# Patient Record
Sex: MALE | Race: WHITE
[De-identification: names, ages, dates, MRNs, and addresses within clinical notes are randomized per-mention and may not be internally consistent; named-entity substitution may affect disease eponyms.]

---

## 2022-01-22 ENCOUNTER — HOSPITAL ENCOUNTER (EMERGENCY)
Dept: HOSPITAL 93 - ER | Age: 77
LOS: 1 days | Discharge: HOME | End: 2022-01-23
Payer: COMMERCIAL

## 2022-01-22 VITALS — HEIGHT: 63 IN | BODY MASS INDEX: 27.29 KG/M2 | WEIGHT: 154 LBS

## 2022-01-22 DIAGNOSIS — I50.9: Primary | ICD-10-CM

## 2022-01-22 DIAGNOSIS — J84.10: ICD-10-CM

## 2022-01-22 DIAGNOSIS — Z11.52: ICD-10-CM

## 2022-02-12 ENCOUNTER — HOSPITAL ENCOUNTER (EMERGENCY)
Dept: HOSPITAL 93 - ER | Age: 77
Discharge: HOME | End: 2022-02-12
Payer: COMMERCIAL

## 2022-02-12 VITALS — BODY MASS INDEX: 27.29 KG/M2 | WEIGHT: 154 LBS | HEIGHT: 63 IN

## 2022-02-12 DIAGNOSIS — S22.089A: ICD-10-CM
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
## 2022-02-12 DIAGNOSIS — S32.059A: Primary | ICD-10-CM

## 2022-02-12 DIAGNOSIS — S00.91XA: ICD-10-CM

## 2022-02-12 DIAGNOSIS — M54.50: ICD-10-CM

## 2022-05-31 ENCOUNTER — HOSPITAL ENCOUNTER (OUTPATIENT)
Dept: HOSPITAL 93 - RX STUDY | Age: 77
Discharge: HOME | End: 2022-05-31
Attending: INTERNAL MEDICINE
Payer: COMMERCIAL

## 2022-05-31 DIAGNOSIS — R13.10: Primary | ICD-10-CM

## 2024-02-12 ENCOUNTER — HOSPITAL ENCOUNTER (EMERGENCY)
Dept: HOSPITAL 93 - ER | Age: 79
Discharge: HOME | End: 2024-02-12
Payer: COMMERCIAL

## 2024-02-12 VITALS — BODY MASS INDEX: 25.69 KG/M2 | WEIGHT: 145 LBS | HEIGHT: 63 IN

## 2024-02-12 DIAGNOSIS — J06.9: Primary | ICD-10-CM

## 2024-02-12 DIAGNOSIS — Z95.1: ICD-10-CM

## 2024-02-12 DIAGNOSIS — Z20.822: ICD-10-CM

## 2024-02-12 LAB
ANION GAP SERPL CALC-SCNC: 8 MMOL/L (ref 10–20)
BACTERIA UR QL AUTO: 12.5 UL (ref 0–1933)
BASE EXCESS BLD CALC-SCNC: -1.6 MMOL/L
BASOPHILS NFR BLD AUTO: 0.4 % (ref 0–1.5)
BUN SERPL-MCNC: 24 MG/DL (ref 7–18)
BUN/CREAT SERPL: 21 (ref 7–25)
CALCIUM SERPL-MCNC: 8.8 MG/DL (ref 8.5–10.1)
CHLORIDE SERPL-SCNC: 102 MMOL/L (ref 98–107)
CO2 SERPL-SCNC: 29 MEQ/L (ref 21–32)
CREAT SERPL-MCNC: 1.17 MG/DL (ref 0.7–1.3)
EGFRCR SERPLBLD CKD-EPI 2021: 59.98 ML/MIN/{1.73_M2}
EOSINOPHIL NFR BLD AUTO: 1.2 % (ref 0–7)
EPI CELLS URNS QL MICRO: 6 UL (ref 0–38.8)
ERYTHROCYTE [DISTWIDTH] IN BLOOD BY AUTOMATED COUNT: 15.5 % (ref 11.5–14.5)
GLUCOSE SERPL-MCNC: 124 MG/DL (ref 65–100)
HCO3 BLDA-SCNC: 21.8 MMOL/L (ref 23–25)
HCT VFR BLD AUTO: 32.7 % (ref 39–48)
HGB BLD-MCNC: 10.7 G/DL (ref 13–16)
LYMPHOCYTES NFR BLD AUTO: 8.2 % (ref 12–44)
MCH RBC QN AUTO: 25.5 PG (ref 27–32)
MCHC RBC AUTO-ENTMCNC: 32.8 G/DL (ref 32–36)
MCV RBC AUTO: 77.7 FL (ref 80–100)
MONOCYTES NFR BLD AUTO: 14.9 % (ref 2–10)
NEUTROPHILS NFR BLD AUTO: 75.3 % (ref 47–76)
O2 CT BLDA-SCNC: 21 %
OSMOLALITY SERPL: 276 MOSM/KG (ref 275–295)
PH BLDA: 7.43 [PH] (ref 7.35–7.45)
PH UR: 6 [PH] (ref 5–8)
PLATELET # BLD AUTO: 194 K/UL (ref 150–450)
PMV BLD AUTO: 7.5 FL (ref 7.2–11.1)
PO2 BLDA: 70 MMHG (ref 80–100)
POTASSIUM SERPL-SCNC: 4.31 MEQ/L (ref 3.5–5.1)
RBC # BLD AUTO: 4.21 M/UL (ref 4–6)
RBC #/AREA URNS AUTO: 4 UL (ref 0–20.8)
SODIUM SERPL-SCNC: 135 MMOL/L (ref 136–145)
SP GR UR STRIP: 1.02 (ref 1–1.03)
UROBILINOGEN UR STRIP-MCNC: 1 E.U./DL
WBC # BLD AUTO: 6.8 K/UL (ref 4.5–11)
WBC URNS QL MICRO: 3.5 UL (ref 0–23.2)

## 2024-08-01 ENCOUNTER — HOSPITAL ENCOUNTER (INPATIENT)
Dept: HOSPITAL 93 - ER | Age: 79
LOS: 8 days | Discharge: HOME | DRG: 291 | End: 2024-08-09
Attending: GENERAL PRACTICE | Admitting: GENERAL PRACTICE
Payer: COMMERCIAL

## 2024-08-01 VITALS — HEIGHT: 63 IN | BODY MASS INDEX: 24.8 KG/M2 | WEIGHT: 140 LBS

## 2024-08-01 DIAGNOSIS — I11.0: Primary | ICD-10-CM

## 2024-08-01 DIAGNOSIS — E87.5: ICD-10-CM

## 2024-08-01 DIAGNOSIS — J84.10: ICD-10-CM

## 2024-08-01 DIAGNOSIS — I50.23: ICD-10-CM

## 2024-08-01 DIAGNOSIS — R09.02: ICD-10-CM

## 2024-08-01 DIAGNOSIS — J90: ICD-10-CM

## 2024-08-01 LAB
ALT SERPL-CCNC: 71 U/L (ref 12–78)
ANION GAP SERPL CALC-SCNC: 11 MMOL/L (ref 10–20)
BACTERIA UR QL AUTO: 18.8 UL (ref 0–1933)
BASE EXCESS BLD CALC-SCNC: -0.7 MMOL/L
BASOPHILS NFR BLD AUTO: 0.6 % (ref 0–1.5)
BUN SERPL-MCNC: 18 MG/DL (ref 7–18)
BUN/CREAT SERPL: 19 (ref 7–25)
CALCIUM SERPL-MCNC: 9.1 MG/DL (ref 8.5–10.1)
CASTS # UR AUTO: 0.3 UL (ref 0–1.4)
CHLORIDE SERPL-SCNC: 99 MMOL/L (ref 98–107)
CHOLEST SERPL-MCNC: 131 MG/DL (ref 0–200)
CHOLEST/HDLC SERPL: 2.3 {RATIO} (ref 0–5)
CK SERPL-CCNC: 86 U/L (ref 39–308)
CO2 SERPL-SCNC: 31 MEQ/L (ref 21–32)
CREAT SERPL-MCNC: 0.94 MG/DL (ref 0.7–1.3)
DIGOXIN SERPL-MCNC: 0.2 NG/ML (ref 0.8–2)
EGFRCR SERPLBLD CKD-EPI 2021: 77.02 ML/MIN/{1.73_M2}
EOSINOPHIL NFR BLD AUTO: 1.9 % (ref 0–7)
EPI CELLS URNS QL MICRO: 2.6 UL (ref 0–38.8)
ERYTHROCYTE [DISTWIDTH] IN BLOOD BY AUTOMATED COUNT: 17.1 % (ref 11.5–14.5)
GLUCOSE SERPL-MCNC: 106 MG/DL (ref 65–100)
HCO3 BLDA-SCNC: 23.3 MMOL/L (ref 23–25)
HCT VFR BLD AUTO: 40.6 % (ref 39–48)
HDLC SERPL-MCNC: 57 MG/DL (ref 40–60)
HGB BLD-MCNC: 13.5 G/DL (ref 13–16)
INR PPP: 1.12
LDH SERPL L TO P-CCNC: 337 U/L (ref 87–241)
LDLC SERPL CALC-MCNC: 58 MG/DL (ref 0–130)
LYMPHOCYTES NFR BLD AUTO: 16.1 % (ref 12–44)
MAGNESIUM SERPL-MCNC: 2.1 MG/DL (ref 1.8–2.4)
MCH RBC QN AUTO: 26.4 PG (ref 27–32)
MCHC RBC AUTO-ENTMCNC: 33.2 G/DL (ref 32–36)
MCV RBC AUTO: 79.5 FL (ref 80–100)
MONOCYTES NFR BLD AUTO: 12.4 % (ref 2–10)
NEUTROPHILS NFR BLD AUTO: 69 % (ref 47–76)
O2 CT BLDA-SCNC: 32 %
OSMOLALITY SERPL: 272 MOSM/KG (ref 275–295)
PH BLDA: 7.42 [PH] (ref 7.35–7.45)
PH UR: 6 [PH] (ref 5–8)
PLATELET # BLD AUTO: 200 K/UL (ref 150–450)
PMV BLD AUTO: 7.1 FL (ref 7.2–11.1)
PO2 BLDA: 92.9 MMHG (ref 80–100)
POTASSIUM SERPL-SCNC: 5.51 MEQ/L (ref 3.5–5.1)
PROTHROMBIN TIME: 11.7 SECONDS (ref 9–11.5)
RBC # BLD AUTO: 5.11 M/UL (ref 4–6)
RBC #/AREA URNS AUTO: 26.8 UL (ref 0–20.8)
SCC AG SERPL-SCNC: 60 U/L (ref 15–37)
SODIUM SERPL-SCNC: 135 MMOL/L (ref 136–145)
SP GR UR STRIP: 1.02 (ref 1–1.03)
TRIGL SERPL-MCNC: 79 MG/DL (ref 0–150)
URATE SERPL-MCNC: 2.7 MG/DL (ref 3.5–8.5)
UROBILINOGEN UR STRIP-MCNC: 1 E.U./DL
VLDLC SERPL CALC-MCNC: 15 MG/DL (ref 0–39)
WBC # BLD AUTO: 8.9 K/UL (ref 4.5–11)
WBC URNS QL MICRO: 15.4 UL (ref 0–23.2)

## 2024-08-01 PROCEDURE — BB24ZZZ COMPUTERIZED TOMOGRAPHY (CT SCAN) OF BILATERAL LUNGS: ICD-10-PCS | Performed by: INTERNAL MEDICINE

## 2024-08-01 PROCEDURE — B246ZZZ ULTRASONOGRAPHY OF RIGHT AND LEFT HEART: ICD-10-PCS | Performed by: INTERNAL MEDICINE

## 2024-08-03 LAB
ALBUMIN SERPL-MCNC: 3.2 GM/DL (ref 3.4–5)
ALP SERPL-CCNC: 94 U/L (ref 50–136)
ALT SERPL-CCNC: 39 U/L (ref 12–78)
ANION GAP SERPL CALC-SCNC: 22 MMOL/L (ref 10–20)
BASOPHILS NFR BLD AUTO: 0.2 % (ref 0–1.5)
BILIRUB SERPL-MCNC: 1.61 MG/DL (ref 0.3–1.2)
BUN SERPL-MCNC: 22 MG/DL (ref 7–18)
BUN/CREAT SERPL: 26 (ref 7–25)
CALCIUM SERPL-MCNC: 8.8 MG/DL (ref 8.5–10.1)
CHLORIDE SERPL-SCNC: 90 MMOL/L (ref 98–107)
CO2 SERPL-SCNC: 31 MEQ/L (ref 21–32)
CREAT SERPL-MCNC: 0.84 MG/DL (ref 0.7–1.3)
EGFRCR SERPLBLD CKD-EPI 2021: 87.7 ML/MIN/{1.73_M2}
EOSINOPHIL NFR BLD AUTO: 0.5 % (ref 0–7)
ERYTHROCYTE [DISTWIDTH] IN BLOOD BY AUTOMATED COUNT: 17 % (ref 11.5–14.5)
GLOBULIN SER CALC-MCNC: 3.7 G/DL (ref 2.4–3.5)
GLUCOSE SERPL-MCNC: 108 MG/DL (ref 65–100)
HCT VFR BLD AUTO: 39.9 % (ref 39–48)
HGB BLD-MCNC: 13.6 G/DL (ref 13–16)
LYMPHOCYTES NFR BLD AUTO: 12.7 % (ref 12–44)
MAGNESIUM SERPL-MCNC: 1.9 MG/DL (ref 1.8–2.4)
MCH RBC QN AUTO: 26.5 PG (ref 27–32)
MCHC RBC AUTO-ENTMCNC: 34.1 G/DL (ref 32–36)
MCV RBC AUTO: 77.8 FL (ref 80–100)
MONOCYTES NFR BLD AUTO: 13.7 % (ref 2–10)
NEUTROPHILS NFR BLD AUTO: 72.9 % (ref 47–76)
OSMOLALITY SERPL: 281 MOSM/KG (ref 275–295)
PHOSPHATE SERPL-MCNC: 3.7 MG/DL (ref 2.5–4.9)
PLATELET # BLD AUTO: 192 K/UL (ref 150–450)
PMV BLD AUTO: 7.5 FL (ref 7.2–11.1)
POTASSIUM SERPL-SCNC: 3.72 MEQ/L (ref 3.5–5.1)
PROT SERPL-MCNC: 6.9 GM/DL (ref 6.4–8.2)
RBC # BLD AUTO: 5.13 M/UL (ref 4–6)
SCC AG SERPL-SCNC: 35 U/L (ref 15–37)
SODIUM SERPL-SCNC: 139 MMOL/L (ref 136–145)
WBC # BLD AUTO: 10 K/UL (ref 4.5–11)

## 2024-08-03 PROCEDURE — 4A12X4Z MONITORING OF CARDIAC ELECTRICAL ACTIVITY, EXTERNAL APPROACH: ICD-10-PCS | Performed by: GENERAL PRACTICE

## 2024-08-04 LAB
ALBUMIN SERPL-MCNC: 3.1 GM/DL (ref 3.4–5)
ALP SERPL-CCNC: 92 U/L (ref 50–136)
ALT SERPL-CCNC: 36 U/L (ref 12–78)
ANION GAP SERPL CALC-SCNC: 12 MMOL/L (ref 10–20)
BASOPHILS NFR BLD AUTO: 0.1 % (ref 0–1.5)
BILIRUB SERPL-MCNC: 1.67 MG/DL (ref 0.3–1.2)
BUN SERPL-MCNC: 21 MG/DL (ref 7–18)
BUN/CREAT SERPL: 23 (ref 7–25)
CALCIUM SERPL-MCNC: 8.7 MG/DL (ref 8.5–10.1)
CHLORIDE SERPL-SCNC: 88 MMOL/L (ref 98–107)
CO2 SERPL-SCNC: 35 MEQ/L (ref 21–32)
CREAT SERPL-MCNC: 0.93 MG/DL (ref 0.7–1.3)
EGFRCR SERPLBLD CKD-EPI 2021: 77.98 ML/MIN/{1.73_M2}
EOSINOPHIL NFR BLD AUTO: 0.1 % (ref 0–7)
ERYTHROCYTE [DISTWIDTH] IN BLOOD BY AUTOMATED COUNT: 17.1 % (ref 11.5–14.5)
GLOBULIN SER CALC-MCNC: 3.7 G/DL (ref 2.4–3.5)
GLUCOSE SERPL-MCNC: 100 MG/DL (ref 65–100)
HCT VFR BLD AUTO: 40.6 % (ref 39–48)
HGB BLD-MCNC: 13.7 G/DL (ref 13–16)
LYMPHOCYTES NFR BLD AUTO: 5.4 % (ref 12–44)
MAGNESIUM SERPL-MCNC: 1.9 MG/DL (ref 1.8–2.4)
MCH RBC QN AUTO: 26.1 PG (ref 27–32)
MCHC RBC AUTO-ENTMCNC: 33.7 G/DL (ref 32–36)
MCV RBC AUTO: 77.5 FL (ref 80–100)
MONOCYTES NFR BLD AUTO: 14.9 % (ref 2–10)
NEUTROPHILS NFR BLD AUTO: 79.5 % (ref 47–76)
OSMOLALITY SERPL: 268 MOSM/KG (ref 275–295)
PHOSPHATE SERPL-MCNC: 2.8 MG/DL (ref 2.5–4.9)
PLATELET # BLD AUTO: 201 K/UL (ref 150–450)
PMV BLD AUTO: 7.7 FL (ref 7.2–11.1)
POTASSIUM SERPL-SCNC: 3.14 MEQ/L (ref 3.5–5.1)
PROT SERPL-MCNC: 6.8 GM/DL (ref 6.4–8.2)
RBC # BLD AUTO: 5.23 M/UL (ref 4–6)
SCC AG SERPL-SCNC: 29 U/L (ref 15–37)
SODIUM SERPL-SCNC: 132 MMOL/L (ref 136–145)
WBC # BLD AUTO: 11.7 K/UL (ref 4.5–11)

## 2024-08-05 LAB
ALBUMIN SERPL-MCNC: 2.8 GM/DL (ref 3.4–5)
ALP SERPL-CCNC: 91 U/L (ref 50–136)
ALT SERPL-CCNC: 32 U/L (ref 12–78)
ANION GAP SERPL CALC-SCNC: 18 MMOL/L (ref 10–20)
BASOPHILS NFR BLD AUTO: 0.3 % (ref 0–1.5)
BILIRUB SERPL-MCNC: 1.39 MG/DL (ref 0.3–1.2)
BUN SERPL-MCNC: 38 MG/DL (ref 7–18)
BUN/CREAT SERPL: 29 (ref 7–25)
CALCIUM SERPL-MCNC: 8.6 MG/DL (ref 8.5–10.1)
CHLORIDE SERPL-SCNC: 87 MMOL/L (ref 98–107)
CO2 SERPL-SCNC: 29 MEQ/L (ref 21–32)
CREAT SERPL-MCNC: 1.29 MG/DL (ref 0.7–1.3)
EGFRCR SERPLBLD CKD-EPI 2021: 53.45 ML/MIN/{1.73_M2}
EOSINOPHIL NFR BLD AUTO: 1 % (ref 0–7)
ERYTHROCYTE [DISTWIDTH] IN BLOOD BY AUTOMATED COUNT: 17.1 % (ref 11.5–14.5)
GLOBULIN SER CALC-MCNC: 3.6 G/DL (ref 2.4–3.5)
GLUCOSE SERPL-MCNC: 85 MG/DL (ref 65–100)
HCT VFR BLD AUTO: 39.8 % (ref 39–48)
HGB BLD-MCNC: 13.4 G/DL (ref 13–16)
LYMPHOCYTES NFR BLD AUTO: 11.2 % (ref 12–44)
MCH RBC QN AUTO: 26.3 PG (ref 27–32)
MCHC RBC AUTO-ENTMCNC: 33.8 G/DL (ref 32–36)
MCV RBC AUTO: 78 FL (ref 80–100)
MONOCYTES NFR BLD AUTO: 15.4 % (ref 2–10)
NEUTROPHILS NFR BLD AUTO: 72.1 % (ref 47–76)
OSMOLALITY SERPL: 269 MOSM/KG (ref 275–295)
PLATELET # BLD AUTO: 200 K/UL (ref 150–450)
PMV BLD AUTO: 7.7 FL (ref 7.2–11.1)
POTASSIUM SERPL-SCNC: 4.25 MEQ/L (ref 3.5–5.1)
PROT SERPL-MCNC: 6.4 GM/DL (ref 6.4–8.2)
RBC # BLD AUTO: 5.1 M/UL (ref 4–6)
SCC AG SERPL-SCNC: 42 U/L (ref 15–37)
SODIUM SERPL-SCNC: 130 MMOL/L (ref 136–145)
WBC # BLD AUTO: 10.8 K/UL (ref 4.5–11)

## 2024-08-06 LAB
ANION GAP SERPL CALC-SCNC: 13 MMOL/L (ref 10–20)
BUN SERPL-MCNC: 40 MG/DL (ref 7–18)
BUN/CREAT SERPL: 34 (ref 7–25)
CALCIUM SERPL-MCNC: 8.7 MG/DL (ref 8.5–10.1)
CHLORIDE SERPL-SCNC: 88 MMOL/L (ref 98–107)
CO2 SERPL-SCNC: 30 MEQ/L (ref 21–32)
CREAT SERPL-MCNC: 1.17 MG/DL (ref 0.7–1.3)
EGFRCR SERPLBLD CKD-EPI 2021: 59.83 ML/MIN/{1.73_M2}
GLUCOSE SERPL-MCNC: 92 MG/DL (ref 65–100)
OSMOLALITY SERPL: 265 MOSM/KG (ref 275–295)
POTASSIUM SERPL-SCNC: 3.93 MEQ/L (ref 3.5–5.1)
SODIUM SERPL-SCNC: 127 MMOL/L (ref 136–145)

## 2024-08-07 LAB
ALBUMIN SERPL-MCNC: 2.5 GM/DL (ref 3.4–5)
ALP SERPL-CCNC: 96 U/L (ref 50–136)
ALT SERPL-CCNC: 35 U/L (ref 12–78)
ANION GAP SERPL CALC-SCNC: 12 MMOL/L (ref 10–20)
BACTERIA UR QL AUTO: 13.8 UL (ref 0–1933)
BASOPHILS NFR BLD AUTO: 0.8 % (ref 0–1.5)
BILIRUB SERPL-MCNC: 0.99 MG/DL (ref 0.3–1.2)
BUN SERPL-MCNC: 33 MG/DL (ref 7–18)
BUN/CREAT SERPL: 40 (ref 7–25)
CALCIUM SERPL-MCNC: 8.8 MG/DL (ref 8.5–10.1)
CASTS # UR AUTO: 1.67 UL (ref 0–1.4)
CHLORIDE SERPL-SCNC: 92 MMOL/L (ref 98–107)
CO2 SERPL-SCNC: 32 MEQ/L (ref 21–32)
CREAT SERPL-MCNC: 0.82 MG/DL (ref 0.7–1.3)
EGFRCR SERPLBLD CKD-EPI 2021: 90.17 ML/MIN/{1.73_M2}
EOSINOPHIL NFR BLD AUTO: 6.3 % (ref 0–7)
EPI CELLS URNS QL MICRO: 13.5 UL (ref 0–38.8)
ERYTHROCYTE [DISTWIDTH] IN BLOOD BY AUTOMATED COUNT: 17.2 % (ref 11.5–14.5)
GLOBULIN SER CALC-MCNC: 3.8 G/DL (ref 2.4–3.5)
GLUCOSE SERPL-MCNC: 91 MG/DL (ref 65–100)
HCT VFR BLD AUTO: 41.6 % (ref 39–48)
HGB BLD-MCNC: 13.9 G/DL (ref 13–16)
LYMPHOCYTES NFR BLD AUTO: 20 % (ref 12–44)
MAGNESIUM SERPL-MCNC: 2.2 MG/DL (ref 1.8–2.4)
MCH RBC QN AUTO: 26.1 PG (ref 27–32)
MCHC RBC AUTO-ENTMCNC: 33.3 G/DL (ref 32–36)
MCV RBC AUTO: 78.4 FL (ref 80–100)
MONOCYTES NFR BLD AUTO: 14.2 % (ref 2–10)
NEUTROPHILS NFR BLD AUTO: 58.7 % (ref 47–76)
OSMOLALITY SERPL: 271 MOSM/KG (ref 275–295)
PH UR: 5.5 [PH] (ref 5–8)
PHOSPHATE SERPL-MCNC: 3.3 MG/DL (ref 2.5–4.9)
PLATELET # BLD AUTO: 213 K/UL (ref 150–450)
PMV BLD AUTO: 7.5 FL (ref 7.2–11.1)
POTASSIUM SERPL-SCNC: 4.26 MEQ/L (ref 3.5–5.1)
PROT SERPL-MCNC: 6.3 GM/DL (ref 6.4–8.2)
RBC # BLD AUTO: 5.31 M/UL (ref 4–6)
RBC #/AREA URNS AUTO: 8.2 UL (ref 0–20.8)
SCC AG SERPL-SCNC: 41 U/L (ref 15–37)
SODIUM SERPL-SCNC: 132 MMOL/L (ref 136–145)
SP GR UR STRIP: 1.01 (ref 1–1.03)
TSH SERPL-ACNC: 3.55 UIU/ML (ref 0.36–3.74)
UROBILINOGEN UR STRIP-MCNC: 1 E.U./DL
WBC # BLD AUTO: 15.7 K/UL (ref 4.5–11)
WBC URNS QL MICRO: 5.7 UL (ref 0–23.2)

## 2024-08-07 PROCEDURE — BB24YZZ COMPUTERIZED TOMOGRAPHY (CT SCAN) OF BILATERAL LUNGS USING OTHER CONTRAST: ICD-10-PCS | Performed by: GENERAL PRACTICE

## 2024-08-08 LAB
ALBUMIN SERPL-MCNC: 2.6 GM/DL (ref 3.4–5)
ALP SERPL-CCNC: 92 U/L (ref 50–136)
ALT SERPL-CCNC: 32 U/L (ref 12–78)
ANION GAP SERPL CALC-SCNC: 11 MMOL/L (ref 10–20)
BASOPHILS NFR BLD AUTO: 0.6 % (ref 0–1.5)
BILIRUB SERPL-MCNC: 0.97 MG/DL (ref 0.3–1.2)
BUN SERPL-MCNC: 37 MG/DL (ref 7–18)
BUN/CREAT SERPL: 41 (ref 7–25)
CALCIUM SERPL-MCNC: 8.8 MG/DL (ref 8.5–10.1)
CHLORIDE SERPL-SCNC: 94 MMOL/L (ref 98–107)
CO2 SERPL-SCNC: 33 MEQ/L (ref 21–32)
CREAT SERPL-MCNC: 0.91 MG/DL (ref 0.7–1.3)
EGFRCR SERPLBLD CKD-EPI 2021: 79.96 ML/MIN/{1.73_M2}
EOSINOPHIL NFR BLD AUTO: 5 % (ref 0–7)
ERYTHROCYTE [DISTWIDTH] IN BLOOD BY AUTOMATED COUNT: 16.4 % (ref 11.5–14.5)
GLOBULIN SER CALC-MCNC: 3.6 G/DL (ref 2.4–3.5)
GLUCOSE SERPL-MCNC: 101 MG/DL (ref 65–100)
HCT VFR BLD AUTO: 39 % (ref 39–48)
HGB BLD-MCNC: 13 G/DL (ref 13–16)
LYMPHOCYTES NFR BLD AUTO: 19.1 % (ref 12–44)
MAGNESIUM SERPL-MCNC: 2 MG/DL (ref 1.8–2.4)
MCH RBC QN AUTO: 26.5 PG (ref 27–32)
MCHC RBC AUTO-ENTMCNC: 33.5 G/DL (ref 32–36)
MCV RBC AUTO: 79.1 FL (ref 80–100)
MONOCYTES NFR BLD AUTO: 15.8 % (ref 2–10)
NEUTROPHILS NFR BLD AUTO: 59.5 % (ref 47–76)
OSMOLALITY SERPL: 277 MOSM/KG (ref 275–295)
PHOSPHATE SERPL-MCNC: 3.1 MG/DL (ref 2.5–4.9)
PLATELET # BLD AUTO: 249 K/UL (ref 150–450)
PMV BLD AUTO: 7 FL (ref 7.2–11.1)
POTASSIUM SERPL-SCNC: 3.87 MEQ/L (ref 3.5–5.1)
PROT SERPL-MCNC: 6.2 GM/DL (ref 6.4–8.2)
RBC # BLD AUTO: 4.93 M/UL (ref 4–6)
SCC AG SERPL-SCNC: 32 U/L (ref 15–37)
SODIUM SERPL-SCNC: 134 MMOL/L (ref 136–145)
WBC # BLD AUTO: 7.9 K/UL (ref 4.5–11)

## 2024-10-24 ENCOUNTER — HOSPITAL ENCOUNTER (INPATIENT)
Dept: HOSPITAL 93 - ER | Age: 79
LOS: 3 days | Discharge: HOME | DRG: 292 | End: 2024-10-27
Attending: INTERNAL MEDICINE | Admitting: INTERNAL MEDICINE
Payer: COMMERCIAL

## 2024-10-24 VITALS — WEIGHT: 130 LBS | HEIGHT: 60 IN | BODY MASS INDEX: 25.52 KG/M2

## 2024-10-24 VITALS — OXYGEN SATURATION: 95 %

## 2024-10-24 VITALS — OXYGEN SATURATION: 96 %

## 2024-10-24 DIAGNOSIS — I77.9: ICD-10-CM

## 2024-10-24 DIAGNOSIS — I27.20: ICD-10-CM

## 2024-10-24 DIAGNOSIS — I25.10: ICD-10-CM

## 2024-10-24 DIAGNOSIS — I11.0: Primary | ICD-10-CM

## 2024-10-24 DIAGNOSIS — I50.9: ICD-10-CM

## 2024-10-24 DIAGNOSIS — J84.9: ICD-10-CM

## 2024-10-24 DIAGNOSIS — J84.10: ICD-10-CM

## 2024-10-24 LAB
ALT SERPL-CCNC: 32 U/L (ref 12–78)
ANION GAP SERPL CALC-SCNC: 10 MMOL/L (ref 10–20)
APTT PPP: 25 SECONDS (ref 22–34)
BACTERIA UR QL AUTO: 720.6 UL (ref 0–1933)
BASE EXCESS BLD CALC-SCNC: 0.8 MMOL/L
BASOPHILS NFR BLD AUTO: 0.9 % (ref 0–1.5)
BUN SERPL-MCNC: 24 MG/DL (ref 7–18)
BUN/CREAT SERPL: 22 (ref 7–25)
CALCIUM SERPL-MCNC: 8.5 MG/DL (ref 8.5–10.1)
CASTS # UR AUTO: 0.91 UL (ref 0–1.4)
CHLORIDE SERPL-SCNC: 103 MMOL/L (ref 98–107)
CHOLEST SERPL-MCNC: 116 MG/DL (ref 0–200)
CHOLEST/HDLC SERPL: 2.4 {RATIO} (ref 0–5)
CK SERPL-CCNC: 66 U/L (ref 39–308)
CO2 SERPL-SCNC: 27 MEQ/L (ref 21–32)
CREAT SERPL-MCNC: 1.07 MG/DL (ref 0.7–1.3)
DIGOXIN SERPL-MCNC: 0.1 NG/ML (ref 0.8–2)
EGFRCR SERPLBLD CKD-EPI 2021: 66.33 ML/MIN/{1.73_M2}
EOSINOPHIL NFR BLD AUTO: 3.7 % (ref 0–7)
EPI CELLS URNS QL MICRO: 9.5 UL (ref 0–38.8)
ERYTHROCYTE [DISTWIDTH] IN BLOOD BY AUTOMATED COUNT: 17.7 % (ref 11.5–14.5)
GLUCOSE SERPL-MCNC: 99 MG/DL (ref 65–100)
GLUCOSE UR STRIP-MCNC: 500 MG/DL
HCO3 BLDA-SCNC: 25 MMOL/L (ref 23–25)
HCT VFR BLD AUTO: 37.6 % (ref 39–48)
HDLC SERPL-MCNC: 49 MG/DL (ref 40–60)
HGB BLD-MCNC: 12.1 G/DL (ref 13–16)
INR PPP: 1.13
INR PPP: 1.16
LDH SERPL L TO P-CCNC: 309 U/L (ref 87–241)
LDLC SERPL CALC-MCNC: 46 MG/DL (ref 0–130)
LYMPHOCYTES NFR BLD AUTO: 15.6 % (ref 12–44)
MAGNESIUM SERPL-MCNC: 2.2 MG/DL (ref 1.8–2.4)
MCH RBC QN AUTO: 25.2 PG (ref 27–32)
MCHC RBC AUTO-ENTMCNC: 32.3 G/DL (ref 32–36)
MCV RBC AUTO: 78.2 FL (ref 80–100)
MONOCYTES NFR BLD AUTO: 11.6 % (ref 2–10)
NEUTROPHILS NFR BLD AUTO: 68.2 % (ref 47–76)
O2 CT BLDA-SCNC: 21 %
OSMOLALITY SERPL: 274 MOSM/KG (ref 275–295)
PH BLDA: 7.43 [PH] (ref 7.35–7.45)
PH UR: 5 [PH] (ref 5–8)
PLATELET # BLD AUTO: 221 K/UL (ref 150–450)
PMV BLD AUTO: 6.5 FL (ref 7.2–11.1)
PO2 BLDA: 83.8 MMHG (ref 80–100)
POTASSIUM SERPL-SCNC: 4.91 MEQ/L (ref 3.5–5.1)
PROTHROMBIN TIME: 12.2 SECONDS (ref 9–11.5)
PROTHROMBIN TIME: 12.5 SECONDS (ref 9–11.5)
RBC # BLD AUTO: 4.8 M/UL (ref 4–6)
RBC #/AREA URNS AUTO: 294.9 UL (ref 0–20.8)
SCC AG SERPL-SCNC: 28 U/L (ref 15–37)
SODIUM SERPL-SCNC: 135 MMOL/L (ref 136–145)
SP GR UR STRIP: 1.01 (ref 1–1.03)
TRIGL SERPL-MCNC: 105 MG/DL (ref 0–150)
URATE SERPL-MCNC: 2.7 MG/DL (ref 3.5–8.5)
UROBILINOGEN UR STRIP-MCNC: 0.2 E.U./DL
VLDLC SERPL CALC-MCNC: 21 MG/DL (ref 0–39)
WBC # BLD AUTO: 8.3 K/UL (ref 4.5–11)
WBC URNS QL MICRO: 24.7 UL (ref 0–23.2)

## 2024-10-24 PROCEDURE — BW24ZZZ COMPUTERIZED TOMOGRAPHY (CT SCAN) OF CHEST AND ABDOMEN: ICD-10-PCS | Performed by: INTERNAL MEDICINE

## 2024-10-24 PROCEDURE — B24BYZZ ULTRASONOGRAPHY OF HEART WITH AORTA USING OTHER CONTRAST: ICD-10-PCS | Performed by: INTERNAL MEDICINE

## 2024-10-24 NOTE — NUR
SE RECIBE PACIENTE PACIENTE ALERTA Y ORIENTADO X 3 ESFERAS DEL AREA DE TRIAGE
A LA UNIDAD DE DOLOR DE PECHO, SE CONECTA A MONITOR CARDIACO Y OXIMETRIA DE
PULSO.  EVALUA PTE. SE ORIENTA A PACIENTE SOBRE TX MEDICO, REFIERE
ENTENDER. SE REALIZAN MUESTRAS DE LABORATORIO BAJO MEDIDAS ASEPTICAS. SE
ADMINISTRA MEDICAMENTO SHANTELL ORDEN MEDICA. SE COORDINAN RICK X. SE INSERTA
SONDA URINARIA BAJO MEDIDAS ESTERILES. SE NOTIFICAN ABG A . SE JULIUS A
PACIENTE EN CAMA CON BARANDAS ELEVADAS POR SEGURIDAD EN ESPERA DE RE-EVALUACION
MEDICA.

## 2024-10-24 NOTE — NUR
SE RECIBE PTE ALERTA YORIENTADO EL CUAL REFIERE VENIR POR DIFICULTAD
RESPIRATORIA DESDE HACE VARIOS HAGER. PTE REFIERE FERMIN ESTADO ADMITIDO AL
HOSPITAL HACE POCO Y HABERLE REALIZADO JAN TORACENTESIS. SE MIDEN S/V A PTE, SE
OBSERVA A PTE CON DISTRESS RESPIRATORIO SATURANDO 85%. SE REALIZA EKG A PTE Y
SE UBICA EN UNIDAD DE CHEST PAIN.

## 2024-10-25 VITALS — OXYGEN SATURATION: 95 %

## 2024-10-25 VITALS — OXYGEN SATURATION: 96 %

## 2024-10-25 VITALS — OXYGEN SATURATION: 97 %

## 2024-10-25 VITALS — OXYGEN SATURATION: 99 %

## 2024-10-25 PROCEDURE — 4A12X4Z MONITORING OF CARDIAC ELECTRICAL ACTIVITY, EXTERNAL APPROACH: ICD-10-PCS | Performed by: INTERNAL MEDICINE

## 2024-10-26 VITALS — OXYGEN SATURATION: 98 %

## 2024-10-26 VITALS — OXYGEN SATURATION: 97 %

## 2024-10-26 VITALS — OXYGEN SATURATION: 95 %

## 2024-10-26 VITALS — OXYGEN SATURATION: 94 %

## 2024-10-26 VITALS — OXYGEN SATURATION: 90 %

## 2024-10-26 VITALS — OXYGEN SATURATION: 83 %

## 2024-10-27 VITALS — OXYGEN SATURATION: 95 %

## 2024-10-27 VITALS — OXYGEN SATURATION: 96 %

## 2024-10-27 VITALS — OXYGEN SATURATION: 97 %

## 2024-10-27 VITALS — OXYGEN SATURATION: 100 %

## 2025-01-13 ENCOUNTER — HOSPITAL ENCOUNTER (INPATIENT)
Dept: HOSPITAL 93 - ER | Age: 80
LOS: 5 days | Discharge: HOME | DRG: 292 | End: 2025-01-18
Attending: STUDENT IN AN ORGANIZED HEALTH CARE EDUCATION/TRAINING PROGRAM | Admitting: STUDENT IN AN ORGANIZED HEALTH CARE EDUCATION/TRAINING PROGRAM
Payer: COMMERCIAL

## 2025-01-13 VITALS — WEIGHT: 130 LBS | HEIGHT: 62 IN | BODY MASS INDEX: 23.92 KG/M2

## 2025-01-13 VITALS — OXYGEN SATURATION: 92 %

## 2025-01-13 DIAGNOSIS — I50.9: ICD-10-CM

## 2025-01-13 DIAGNOSIS — J44.1: ICD-10-CM

## 2025-01-13 DIAGNOSIS — I11.0: Primary | ICD-10-CM

## 2025-01-13 DIAGNOSIS — I27.20: ICD-10-CM

## 2025-01-13 DIAGNOSIS — J90: ICD-10-CM

## 2025-01-13 LAB
ALBUMIN SERPL-MCNC: 3.3 GM/DL (ref 3.4–5)
ALP SERPL-CCNC: 137 U/L (ref 50–136)
ALT SERPL-CCNC: 34 U/L (ref 12–78)
ANION GAP SERPL CALC-SCNC: 10 MMOL/L (ref 10–20)
APTT PPP: 27.9 SECONDS (ref 22–34)
BASE EXCESS BLD CALC-SCNC: 0.1 MMOL/L
BASOPHILS NFR BLD AUTO: 0.7 % (ref 0–1.5)
BILIRUB SERPL-MCNC: 0.88 MG/DL (ref 0.3–1.2)
BUN SERPL-MCNC: 33 MG/DL (ref 7–18)
BUN/CREAT SERPL: 21 (ref 7–25)
CALCIUM SERPL-MCNC: 8.9 MG/DL (ref 8.5–10.1)
CHLORIDE SERPL-SCNC: 104 MMOL/L (ref 98–107)
CK SERPL-CCNC: 104 U/L (ref 39–308)
CO2 SERPL-SCNC: 30 MEQ/L (ref 21–32)
CREAT SERPL-MCNC: 1.57 MG/DL (ref 0.7–1.3)
EGFRCR SERPLBLD CKD-EPI 2021: 42.61 ML/MIN/{1.73_M2}
EOSINOPHIL NFR BLD AUTO: 4.1 % (ref 0–7)
ERYTHROCYTE [DISTWIDTH] IN BLOOD BY AUTOMATED COUNT: 19.7 % (ref 11.5–14.5)
GLOBULIN SER CALC-MCNC: 4.1 G/DL (ref 2.4–3.5)
GLUCOSE SERPL-MCNC: 102 MG/DL (ref 65–100)
HCO3 BLDA-SCNC: 23.5 MMOL/L (ref 23–25)
HCT VFR BLD AUTO: 40.4 % (ref 39–48)
HGB BLD-MCNC: 12.9 G/DL (ref 13–16)
INR PPP: 1.18
LDH SERPL L TO P-CCNC: 319 U/L (ref 87–241)
LYMPHOCYTES NFR BLD AUTO: 17.5 % (ref 12–44)
MCH RBC QN AUTO: 24.1 PG (ref 27–32)
MCHC RBC AUTO-ENTMCNC: 32 G/DL (ref 32–36)
MCV RBC AUTO: 75.1 FL (ref 80–100)
MONOCYTES NFR BLD AUTO: 12.6 % (ref 2–10)
NEUTROPHILS NFR BLD AUTO: 65.1 % (ref 47–76)
O2 CT BLDA-SCNC: 21 %
OSMOLALITY SERPL: 283 MOSM/KG (ref 275–295)
PH BLDA: 7.45 [PH] (ref 7.35–7.45)
PLATELET # BLD AUTO: 184 K/UL (ref 150–450)
PMV BLD AUTO: 7 FL (ref 7.2–11.1)
PO2 BLDA: 42.8 MMHG (ref 80–100)
POTASSIUM SERPL-SCNC: 5.51 MEQ/L (ref 3.5–5.1)
PROT SERPL-MCNC: 7.4 GM/DL (ref 6.4–8.2)
PROTHROMBIN TIME: 12.7 SECONDS (ref 9–11.5)
RBC # BLD AUTO: 5.38 M/UL (ref 4–6)
SCC AG SERPL-SCNC: 29 U/L (ref 15–37)
SODIUM SERPL-SCNC: 138 MMOL/L (ref 136–145)
WBC # BLD AUTO: 10.3 K/UL (ref 4.5–11)

## 2025-01-13 PROCEDURE — BB24ZZZ COMPUTERIZED TOMOGRAPHY (CT SCAN) OF BILATERAL LUNGS: ICD-10-PCS | Performed by: STUDENT IN AN ORGANIZED HEALTH CARE EDUCATION/TRAINING PROGRAM

## 2025-01-13 NOTE — NUR
SE RECIBE MASCULINO ALERTA Y ORIENTADO X3 A AREA DE CRITICO CAMA #3, SE OBSERVA
CON RESPIRACIONES LABORIOSAS. SE CONECTA A MONITOR CARDIACO Y OXIMETRIA DE
PULSO. CANANLIZACION #18 X2 LT ARM PATENTES CASH DE EDEMA Y ERITEMA EN H/L. SE
ADMINITRAN MEDICAMENTOS SHANTELL ORDEN MEDICA Y BAJO MEDIDAS ASEPTICAS. C/N
2LT/MIN. NO PRESENTA EDEMA NI ERITEMA EN EXTREMIDADES SUPERIOES E INFERIORES.
NO SE OBSERVA DISTENSION ABDOMINAL, BLANDO AL TACTO Y NIEGA DOLOR. SE COLACA
[place] BAJO MEDIDAS ASEPTICAS Y ESTERILES. PRESENTA APROX [address]
AMARILLO INTENSO, SIN [place]. PTE PENDIENTE A ESTUDIO, SE
NOTIFICA. 
 
PTE Y ACE (ESPOSA) FIRMA HOJA DE DIRECTRICES ANTICIPADAS DE "DNI". SE COLOCA
[place] YESSI SHANTELL PROTOCOLO.

## 2025-01-13 NOTE — NUR
SE RECIBE PTE MASCULINO ALERTA Y ORIENTADO X3 QUEIN REFIERE DIFICULTAD PARA
RESPIRAR Y TOS DESDE HACE VARIOS HAGER. PTE REFERIDO POR , POR
SINTOMAS. AL MOMENTO DE TRIAGE PTE PRESENTANDO SATURACION DE O2 MANUAL AL 83% Y
HACIENDO USO DE MUSCULOS ACCESRORIOS. SE REALZIA EKG Y SE PRESENTA A MD DE
[place]. SE UBICA PTE EN AREA DE CRITICO, SE CONECTA A MONITOR CARDIACO Y
OXIMETRIA DE PULSO CONTINUA.

## 2025-01-14 VITALS — OXYGEN SATURATION: 96 %

## 2025-01-14 VITALS — OXYGEN SATURATION: 98 %

## 2025-01-14 VITALS — OXYGEN SATURATION: 91 %

## 2025-01-14 VITALS — OXYGEN SATURATION: 90 %

## 2025-01-14 VITALS — OXYGEN SATURATION: 94 %

## 2025-01-14 PROCEDURE — B246ZZZ ULTRASONOGRAPHY OF RIGHT AND LEFT HEART: ICD-10-PCS | Performed by: STUDENT IN AN ORGANIZED HEALTH CARE EDUCATION/TRAINING PROGRAM

## 2025-01-14 PROCEDURE — 4A12X4Z MONITORING OF CARDIAC ELECTRICAL ACTIVITY, EXTERNAL APPROACH: ICD-10-PCS | Performed by: STUDENT IN AN ORGANIZED HEALTH CARE EDUCATION/TRAINING PROGRAM

## 2025-01-14 PROCEDURE — 3E0F7GC INTRODUCTION OF OTHER THERAPEUTIC SUBSTANCE INTO RESPIRATORY TRACT, VIA NATURAL OR ARTIFICIAL OPENING: ICD-10-PCS | Performed by: STUDENT IN AN ORGANIZED HEALTH CARE EDUCATION/TRAINING PROGRAM

## 2025-01-15 VITALS — OXYGEN SATURATION: 98 %

## 2025-01-15 VITALS — OXYGEN SATURATION: 97 %

## 2025-01-15 VITALS — OXYGEN SATURATION: 91 %

## 2025-01-15 VITALS — OXYGEN SATURATION: 96 %

## 2025-01-15 LAB
ANION GAP SERPL CALC-SCNC: 8 MMOL/L (ref 10–20)
BASOPHILS NFR BLD AUTO: 0.5 % (ref 0–1.5)
BUN SERPL-MCNC: 33 MG/DL (ref 7–18)
BUN/CREAT SERPL: 28 (ref 7–25)
CALCIUM SERPL-MCNC: 8.7 MG/DL (ref 8.5–10.1)
CHLORIDE SERPL-SCNC: 105 MMOL/L (ref 98–107)
CO2 SERPL-SCNC: 29 MEQ/L (ref 21–32)
CREAT SERPL-MCNC: 1.16 MG/DL (ref 0.7–1.3)
EGFRCR SERPLBLD CKD-EPI 2021: 60.43 ML/MIN/{1.73_M2}
EOSINOPHIL NFR BLD AUTO: 4.3 % (ref 0–7)
ERYTHROCYTE [DISTWIDTH] IN BLOOD BY AUTOMATED COUNT: 19.4 % (ref 11.5–14.5)
GLUCOSE SERPL-MCNC: 69 MG/DL (ref 65–100)
HCT VFR BLD AUTO: 38.2 % (ref 39–48)
HGB BLD-MCNC: 12.1 G/DL (ref 13–16)
LYMPHOCYTES NFR BLD AUTO: 12.4 % (ref 12–44)
MCH RBC QN AUTO: 23.8 PG (ref 27–32)
MCHC RBC AUTO-ENTMCNC: 31.7 G/DL (ref 32–36)
MCV RBC AUTO: 74.9 FL (ref 80–100)
MONOCYTES NFR BLD AUTO: 10.9 % (ref 2–10)
NEUTROPHILS NFR BLD AUTO: 71.9 % (ref 47–76)
OSMOLALITY SERPL: 281 MOSM/KG (ref 275–295)
PLATELET # BLD AUTO: 153 K/UL (ref 150–450)
PMV BLD AUTO: 7.3 FL (ref 7.2–11.1)
POTASSIUM SERPL-SCNC: 4.45 MEQ/L (ref 3.5–5.1)
RBC # BLD AUTO: 5.11 M/UL (ref 4–6)
SODIUM SERPL-SCNC: 138 MMOL/L (ref 136–145)
WBC # BLD AUTO: 9.3 K/UL (ref 4.5–11)

## 2025-01-16 VITALS — OXYGEN SATURATION: 97 %

## 2025-01-16 VITALS — OXYGEN SATURATION: 99 %

## 2025-01-16 VITALS — OXYGEN SATURATION: 95 %

## 2025-01-16 VITALS — OXYGEN SATURATION: 93 %

## 2025-01-16 VITALS — OXYGEN SATURATION: 98 %

## 2025-01-16 VITALS — OXYGEN SATURATION: 90 %

## 2025-01-16 VITALS — OXYGEN SATURATION: 96 %

## 2025-01-16 LAB
ANION GAP SERPL CALC-SCNC: 9 MMOL/L (ref 10–20)
BUN SERPL-MCNC: 25 MG/DL (ref 7–18)
BUN/CREAT SERPL: 26 (ref 7–25)
CALCIUM SERPL-MCNC: 8.3 MG/DL (ref 8.5–10.1)
CHLORIDE SERPL-SCNC: 104 MMOL/L (ref 98–107)
CO2 SERPL-SCNC: 31 MEQ/L (ref 21–32)
CREAT SERPL-MCNC: 0.95 MG/DL (ref 0.7–1.3)
EGFRCR SERPLBLD CKD-EPI 2021: 76.09 ML/MIN/{1.73_M2}
GLUCOSE SERPL-MCNC: 124 MG/DL (ref 65–100)
OSMOLALITY SERPL: 283 MOSM/KG (ref 275–295)
POTASSIUM SERPL-SCNC: 4.96 MEQ/L (ref 3.5–5.1)
SODIUM SERPL-SCNC: 139 MMOL/L (ref 136–145)

## 2025-01-17 VITALS — OXYGEN SATURATION: 85 %

## 2025-01-17 VITALS — OXYGEN SATURATION: 99 %

## 2025-01-17 VITALS — OXYGEN SATURATION: 96 %

## 2025-01-17 VITALS — OXYGEN SATURATION: 93 %

## 2025-01-17 VITALS — OXYGEN SATURATION: 92 %

## 2025-01-17 LAB
ANION GAP SERPL CALC-SCNC: 9 MMOL/L (ref 10–20)
BUN SERPL-MCNC: 25 MG/DL (ref 7–18)
BUN/CREAT SERPL: 26 (ref 7–25)
CALCIUM SERPL-MCNC: 8.5 MG/DL (ref 8.5–10.1)
CHLORIDE SERPL-SCNC: 103 MMOL/L (ref 98–107)
CO2 SERPL-SCNC: 33 MEQ/L (ref 21–32)
CREAT SERPL-MCNC: 0.96 MG/DL (ref 0.7–1.3)
EGFRCR SERPLBLD CKD-EPI 2021: 75.17 ML/MIN/{1.73_M2}
GLUCOSE SERPL-MCNC: 122 MG/DL (ref 65–100)
OSMOLALITY SERPL: 287 MOSM/KG (ref 275–295)
POTASSIUM SERPL-SCNC: 4.1 MEQ/L (ref 3.5–5.1)
SODIUM SERPL-SCNC: 141 MMOL/L (ref 136–145)

## 2025-01-18 VITALS — OXYGEN SATURATION: 90 %

## 2025-01-18 VITALS — OXYGEN SATURATION: 95 %

## 2025-01-18 VITALS — OXYGEN SATURATION: 84 %

## 2025-01-18 VITALS — OXYGEN SATURATION: 85 %
